# Patient Record
Sex: FEMALE | Race: WHITE | NOT HISPANIC OR LATINO | Employment: UNEMPLOYED | URBAN - METROPOLITAN AREA
[De-identification: names, ages, dates, MRNs, and addresses within clinical notes are randomized per-mention and may not be internally consistent; named-entity substitution may affect disease eponyms.]

---

## 2019-05-22 ENCOUNTER — HISTORICAL (OUTPATIENT)
Dept: RADIOLOGY | Facility: HOSPITAL | Age: 59
End: 2019-05-22

## 2020-02-22 ENCOUNTER — HOSPITAL ENCOUNTER (OUTPATIENT)
Dept: MEDSURG UNIT | Facility: HOSPITAL | Age: 60
End: 2020-02-23
Attending: INTERNAL MEDICINE | Admitting: INTERNAL MEDICINE

## 2020-02-22 LAB
ABS NEUT (OLG): 4.99 X10(3)/MCL (ref 2.1–9.2)
ALBUMIN SERPL-MCNC: 4.1 GM/DL (ref 3.4–5)
ALBUMIN/GLOB SERPL: 1 RATIO (ref 1.1–2)
ALP SERPL-CCNC: 107 UNIT/L (ref 38–126)
ALT SERPL-CCNC: 80 UNIT/L (ref 12–78)
AST SERPL-CCNC: 88 UNIT/L (ref 15–37)
BASOPHILS # BLD AUTO: 0 X10(3)/MCL (ref 0–0.2)
BASOPHILS NFR BLD AUTO: 0 %
BILIRUB SERPL-MCNC: 0.7 MG/DL (ref 0.2–1)
BILIRUBIN DIRECT+TOT PNL SERPL-MCNC: 0.2 MG/DL (ref 0–0.5)
BILIRUBIN DIRECT+TOT PNL SERPL-MCNC: 0.5 MG/DL (ref 0–0.8)
BUN SERPL-MCNC: 24 MG/DL (ref 7–18)
CALCIUM SERPL-MCNC: 8.8 MG/DL (ref 8.5–10.1)
CHLORIDE SERPL-SCNC: 102 MMOL/L (ref 98–107)
CO2 SERPL-SCNC: 25 MMOL/L (ref 21–32)
CREAT SERPL-MCNC: 1.03 MG/DL (ref 0.55–1.02)
ERYTHROCYTE [DISTWIDTH] IN BLOOD BY AUTOMATED COUNT: 12.8 % (ref 11.5–17)
FLUAV AG UPPER RESP QL IA.RAPID: POSITIVE
FLUBV AG UPPER RESP QL IA.RAPID: NEGATIVE
GLOBULIN SER-MCNC: 4.1 GM/DL (ref 2.4–3.5)
GLUCOSE SERPL-MCNC: 122 MG/DL (ref 74–106)
HCT VFR BLD AUTO: 42.2 % (ref 37–47)
HGB BLD-MCNC: 13.6 GM/DL (ref 12–16)
LYMPHOCYTES # BLD AUTO: 0.9 X10(3)/MCL (ref 0.6–4.6)
LYMPHOCYTES NFR BLD AUTO: 14 %
MCH RBC QN AUTO: 30.7 PG (ref 27–31)
MCHC RBC AUTO-ENTMCNC: 32.2 GM/DL (ref 33–36)
MCV RBC AUTO: 95.3 FL (ref 80–94)
MONOCYTES # BLD AUTO: 0.6 X10(3)/MCL (ref 0.1–1.3)
MONOCYTES NFR BLD AUTO: 9 %
NEUTROPHILS # BLD AUTO: 4.99 X10(3)/MCL (ref 2.1–9.2)
NEUTROPHILS NFR BLD AUTO: 76 %
PLATELET # BLD AUTO: 157 X10(3)/MCL (ref 130–400)
PMV BLD AUTO: 9.3 FL (ref 9.4–12.4)
POTASSIUM SERPL-SCNC: 4.2 MMOL/L (ref 3.5–5.1)
PROT SERPL-MCNC: 8.2 GM/DL (ref 6.4–8.2)
RBC # BLD AUTO: 4.43 X10(6)/MCL (ref 4.2–5.4)
SODIUM SERPL-SCNC: 134 MMOL/L (ref 136–145)
WBC # SPEC AUTO: 6.5 X10(3)/MCL (ref 4.5–11.5)

## 2020-02-23 LAB
ABS NEUT (OLG): 3.44 X10(3)/MCL (ref 2.1–9.2)
BASOPHILS # BLD AUTO: 0 X10(3)/MCL (ref 0–0.2)
BASOPHILS NFR BLD AUTO: 0 %
BUN SERPL-MCNC: 26 MG/DL (ref 7–18)
CALCIUM SERPL-MCNC: 8.2 MG/DL (ref 8.5–10.1)
CHLORIDE SERPL-SCNC: 106 MMOL/L (ref 98–107)
CO2 SERPL-SCNC: 28 MMOL/L (ref 21–32)
CREAT SERPL-MCNC: 0.96 MG/DL (ref 0.55–1.02)
CREAT/UREA NIT SERPL: 27.1
ERYTHROCYTE [DISTWIDTH] IN BLOOD BY AUTOMATED COUNT: 13.1 % (ref 11.5–17)
GLUCOSE SERPL-MCNC: 196 MG/DL (ref 74–106)
HCT VFR BLD AUTO: 39.3 % (ref 37–47)
HGB BLD-MCNC: 12.2 GM/DL (ref 12–16)
LYMPHOCYTES # BLD AUTO: 0.6 X10(3)/MCL (ref 0.6–4.6)
LYMPHOCYTES NFR BLD AUTO: 14 %
MCH RBC QN AUTO: 30.4 PG (ref 27–31)
MCHC RBC AUTO-ENTMCNC: 31 GM/DL (ref 33–36)
MCV RBC AUTO: 98 FL (ref 80–94)
MONOCYTES # BLD AUTO: 0.2 X10(3)/MCL (ref 0.1–1.3)
MONOCYTES NFR BLD AUTO: 4 %
NEUTROPHILS # BLD AUTO: 3.44 X10(3)/MCL (ref 2.1–9.2)
NEUTROPHILS NFR BLD AUTO: 81 %
PLATELET # BLD AUTO: 115 X10(3)/MCL (ref 130–400)
PMV BLD AUTO: 9.5 FL (ref 9.4–12.4)
POTASSIUM SERPL-SCNC: 4.1 MMOL/L (ref 3.5–5.1)
RBC # BLD AUTO: 4.01 X10(6)/MCL (ref 4.2–5.4)
SODIUM SERPL-SCNC: 139 MMOL/L (ref 136–145)
WBC # SPEC AUTO: 4.2 X10(3)/MCL (ref 4.5–11.5)

## 2021-09-02 ENCOUNTER — HISTORICAL (OUTPATIENT)
Dept: INFECTIOUS DISEASES | Facility: HOSPITAL | Age: 61
End: 2021-09-02

## 2022-01-31 ENCOUNTER — HISTORICAL (OUTPATIENT)
Dept: ADMINISTRATIVE | Facility: HOSPITAL | Age: 62
End: 2022-01-31

## 2022-04-30 NOTE — ED PROVIDER NOTES
Patient:   Zari Wilhelm             MRN: [id number]            FIN: [id number]               Age:   59 years     Sex:  Female     :  1960   Associated Diagnoses:   Influenza A; Elevated LFTs   Author:   Amrit Santana      Basic Information   Time seen: Date & time 2020 18:03:00.   History source: Patient.   Arrival mode: Private vehicle, walking.   History limitation: None.      History of Present Illness   The patient presents with cough and Patient c/o coughing, sore throat, headache, and body aches (cornell, NP). .  The onset was 1  days ago.  The course/duration of symptoms is fluctuating in intensity.  Character dry.  The degree at onset was moderate.  The degree at present is moderate.  The exacerbating factor is none.  The relieving factor is none.  Risk factors consist of asthma.  Prior episodes: occasional.  Therapy today: see nurses notes.  Associated symptoms: sore throat, rhinorrhea, nasal congestion and bodyaches, Nausea, Vomiting .        Review of Systems   ENMT symptoms:  Sore throat, nasal congestion.    Respiratory symptoms:  Cough.   Gastrointestinal symptoms:  Nausea, vomiting.    Musculoskeletal symptoms:  body aches.   Neurologic symptoms:  Headache.      Health Status   Allergies:    Allergic Reactions (Selected)  Severity Not Documented  Milk Products- Throat irritation.  Phenergan- Unknown.  Sulfa drugs- Unknown.,    Allergies (3) Active Reaction  Milk Products Throat irritation  Phenergan Unknown  sulfa drugs Unknown  .   Medications:  (Selected)   Prescriptions  Prescribed  Medrol Dosepak 4 mg oral tablet: = 1 tab(s), Oral, Daily, # 10 tab(s), 0 Refill(s)  Documented Medications  Documented  Breo Ellipta 100 mcg-25 mcg/inh inhalation powder:   Myoflex Creme 10% topical cream:   Ventolin HFA 90 mcg/inh inhalation aerosol:   albuterol CFC free 90 mcg/inh inhalation aerosol with adapter:   azithromycin 250 mg oral tablet: Oral, As Directed  levoFLOXacin 25  mg/mL oral solution:   oseltamivir 75 mg oral capsule: 75 mg = 1 cap(s), Oral, BID, per nurse's notes.   Immunizations: Per nurse's notes.   Menstrual history: Per nurse's notes.      Past Medical/ Family/ Social History   Medical history:    No active or resolved past medical history items have been selected or recorded., Reviewed as documented in chart.   Surgical history:    tracheostomy wth reversal, Sx on vocal cords.  RT CORNEA TRANSPLANT..  T&A., Reviewed as documented in chart.   Family history:    Entire family history is negative., Reviewed as documented in chart.   Social history: Reviewed as documented in chart.   Problem list:    Active Problems (1)  Asthma   , per nurse's notes.      Physical Examination               Vital Signs      No qualifying data available.   General:  Alert, no acute distress, well hydrated, Skin: Normal for ethnicity.    Skin:  Warm, dry, pink, intact.    Head:  Normocephalic.   Neck:  Supple, no tenderness, full range of motion.    Eye:  Pupils are equal, round and reactive to light, extraocular movements are intact, normal conjunctiva.    Ears, nose, mouth and throat:  No pharyngeal erythema or exudate, Nose: Bilateral nares, moderate, congestion.    Cardiovascular:  Regular rate and rhythm, No murmur, Normal peripheral perfusion.    Respiratory:  Lungs are clear to auscultation, breath sounds are equal, Respirations: not tachypneic, not labored, not shallow, Retractions: None.    Chest wall:  No tenderness.   Back:  Normal range of motion, Normal alignment, No costovertebral angle tenderness,    Musculoskeletal:  Normal ROM, normal strength, no swelling, no deformity.    Gastrointestinal:  Soft, Nontender, Non distended, Normal bowel sounds.    Neurological:  Alert and oriented to person, place, time, and situation, No focal neurological deficit observed, normal sensory observed, normal motor observed, normal speech observed, normal coordination observed, Gait: Normal.     Psychiatric:  Cooperative, appropriate mood & affect, normal judgment.       Medical Decision Making   Rationale:  Sats in room with good waveform was 87%. She does increase when walking. .   Documents reviewed:  Emergency department nurses' notes.   Orders  Launch Orders   Pharmacy:  Tamiflu 75 mg oral capsule (Order): 75 mg, form: Cap, Oral, Once, first dose 2/22/2020 20:51 CST, stop date 2/22/2020 20:51 CST, STAT, Launch Orders   Patient Care:  Saline Lock Insert (Order): 2/22/2020 20:53 CST  Pharmacy:  NS (0.9% Sodium Chloride) Infusion 1,000 mL (Order): 1,000 mL, 1,000 mL, IV, 1,000 mL/hr, start date 2/22/2020 20:53 CST, 1.66, m2  Zofran 2 mg/mL injectable solution (Order): 4 mg, form: Injection, IV, Once, first dose 2/22/2020 20:53 CST, stop date 2/22/2020 20:53 CST, STAT, Launch Orders   Pharmacy:  ibuprofen 600 mg oral tablet (Order): 600 mg, form: Tab, Oral, Once, first dose 2/22/2020 21:05 CST, stop date 2/22/2020 21:05 CST, STAT, Launch Orders   Pharmacy:  DuoNeb (Order): 3 mL, form: Soln, NEB, Once, first dose 2/22/2020 22:42 CST, stop date 2/22/2020 22:42 CST.    Results review:  Lab results : Lab View   2/22/2020 18:44 CST      Sodium Lvl                134 mmol/L  LOW                             Potassium Lvl             4.2 mmol/L                             Chloride                  102 mmol/L                             CO2                       25.0 mmol/L                             Calcium Lvl               8.8 mg/dL                             Glucose Lvl               122 mg/dL  HI                             BUN                       24.0 mg/dL  HI                             Creatinine                1.03 mg/dL  HI                             eGFR-AA                   >60 mL/min/1.73 m2  NA                             eGFR-CRISS                  59 mL/min/1.73 m2  NA                             Bili Total                0.7 mg/dL                             Bili Direct               0.20  mg/dL                             Bili Indirect             0.50 mg/dL                             AST                       88 unit/L  HI                             ALT                       80 unit/L  HI                             Alk Phos                  107 unit/L                             Total Protein             8.2 gm/dL                             Albumin Lvl               4.10 gm/dL                             Globulin                  4.10 gm/dL  HI                             A/G Ratio                 1.0 ratio  LOW                             WBC                       6.5 x10(3)/mcL                             RBC                       4.43 x10(6)/mcL                             Hgb                       13.6 gm/dL                             Hct                       42.2 %                             Platelet                  157 x10(3)/mcL                             MCV                       95.3 fL  HI                             MCH                       30.7 pg                             MCHC                      32.2 gm/dL  LOW                             RDW                       12.8 %                             MPV                       9.3 fL  LOW                             Abs Neut                  4.99 x10(3)/mcL                             Neutro Auto               76 %  NA                             Lymph Auto                14 %  NA                             Mono Auto                 9 %  NA                             Basophil Auto             0 %  NA                             Abs Neutro                4.99 x10(3)/mcL                             Abs Lymph                 0.9 x10(3)/mcL                             Abs Mono                  0.6 x10(3)/mcL                             Abs Baso                  0.0 x10(3)/mcL    2/22/2020 18:07 CST      Strep A PCR               NOT DETECTED    2/22/2020 18:05 CST      Influ A PCR               Positive                              Influ B PCR               Negative  ,    No qualifying data available.    Chest X-Ray:  Time reported 2/22/2020 23:39:00, no acute disease process, interpretation by Emergency Physician.    Radiology results:      Impression and Plan   Diagnosis   Influenza A (MFB30-VS J10.1)   Elevated LFTs (OFC33-BO R94.5)      Calls-Consults   -  2/22/2020 22:37:00 , Jordan KUHN, Darius LAMBERT, recommends Susan accepts admit.    Plan   Condition: Improved, Stable.    Disposition: Admit time  2/22/2020 23:10:00, Place in Observation Telemetry Unit.    Prescriptions   Patient was given the following educational materials   Follow up with   Counseled: Patient, Regarding diagnosis, Regarding diagnostic results, Regarding treatment plan, Regarding prescription, Patient indicated understanding of instructions.    Orders: Launch Orders   Admit/Transfer/Discharge:  Place in Outpatient Observation (Order): 2/22/2020 23:11 CST, Telemetry with Monitor Jordan KUHN, Darius LAMBERT, No.    Notes: Admitted in collaboration with Dr. Todd.

## 2022-05-04 NOTE — HISTORICAL [facility] CERNER
This is a historical note converted from Cerlefty. Formatting and pictures may have been removed.  Please reference Cerlefty for original formatting and attached multimedia. Chief Complaint  c/o cough, sore throat, HA, and body aches since last night.  History of Present Illness  Mr. Wilhelm is a 59 year old male with a medical history of asthma and tobacco use. She presented to [facility] ER with complaints of body aches, sore throat, headache, and nonproductive cough with occasional wheezing over the last 24 hours. Denies tobacco use or home 02 dependent. She is hoarse at baseline from a prior vocal cord surgery. Reports feeling a little better since being treated in the ER.  Initial ER VS: /85, , respirations 18, oral temperature 102.2, and SPO2 94% on RA.? CMP, CBC unremarkable.? Influenza A positive, strep negative.? CXR negative for acute concerns.? She was given 1 g by mouth Tylenol, ibuprofen, Solu-Medrol 60 IV, Zofran 4, and Tamiflu 75 by mouth while in the ER. Shes been admitted to the hospitalist services for further evaluation and management?of flu with hypoxia; ER reported her Sp02 to drop to 80-85% with ambulation on RA.  Review of Systems  Except as documented, all other systems reviewed and negative.  Physical Exam  Vitals & Measurements  T:?38? ?C (Oral)? TMIN:?37.2? ?C (Oral)? TMAX:?39? ?C (Oral)? HR:?81(Peripheral)? RR:?20? BP:?132/81? SpO2:?93%? WT:?63.5?kg?  General: Appears comfortable, no acute distress.  Cognition and speech:?Oriented, speech clear and coherent. Hoarse at baseline.  HEENT:?Normocephalic, normal hearing, oral mucosa is moist.  Neck:?No JVD, trachea at?midline, supple.  Respiratory:?Bilateral rhonchi, faint wheezing.?No accessory muscle use. ?Breath sounds are equal.  Cardiovascular:?Regular rhythm. Normal S1/S2. No gallops, murmurs or rub.? No pedal edema.  Gastrointestinal:?Normoactive bowel sound, soft and non-tender.  Integumentary:?Warm, dry,  intact.  Musculoskeletal:?Normal strength, no tenderness, no swelling.  Skin:?Warm and dry, no rashes or lesions.  Neuro:?AAOx3, no focal neurological deficit, normal sensory. Follows commands.  Psych:?Cooperative. Appropriate mood and affect.  ?   Assessment:  Acute Hypoxemic Respiratory Failure - Sp02 80-85% on RA with ambulation  Influenza A  Acute Bronchitis  Asthma Exacerbation  ?   Plan:?  - CXR negative for acute concerns; Strep (-)  - Doxycycline 100mg PO BID - Day #1  - Tamiflu 75mg PO BID - Day #1  - Solu-Medrol 60mg IV q8hr - Day #1  - Albuterol nebs q4hr & PRN  - Supplemental oxygen; wean to keep Sp02 > 92% on RA  - Resume HOME medications when med list obtained/med rec updated  - Repeat labs in am  ?  Source of history: Self. Medical record.?  Present at bedside: None.?  History limitation: None.  Provider information: PCP:?BC Perez NP  ?   Code status: Full code  DVT prophylaxis: Lovenox 40mg SC daily?  Admission time 72 minutes.?  ?  I, PARISH Bullard, reviewed and discussed the case with Dr. Darius Ortega.  ?  ?   I, Darius Ortega MD, assumed care of this patient at the time of this addendum and assisted with the composition of the above assessment and plan. For this patient encounter, I reviewed the NP or PA documentation, treatment plan, and medical decision making; and I had face-to-face time with this patient. ?Labs and imaging were reviewed and I agree with history, physical and medical decision making as detailed above.??  ?  59-year-old female presents with cough, shortness of breath and fever. ?Workup revealed positive influenza A swab.? No obvious consolidations on chest x-ray. ?She is also coughing up thick?greenish brown sputum so will be covered with an antibacterial as well for possible acute bronchitis on top of influenza. ?She has been started on Tamiflu.? She has bilateral rhonchi and faint bilateral wheezing on exam but no signs of respiratory distress. ?Wean  oxygen as tolerated, continue antibiotics,?steroids.   Problem List/Past Medical History  Asthma  Former tobacco use  Procedure/Surgical History  RT CORNEA TRANSPLANT.  T&A  tracheostomy wth reversal, Sx on vocal cords   Medications  Inpatient  acetaminophen, 650 mg= 20.3 mL, Oral, q6hr, PRN  acetaminophen, 1000 mg= 2 tab(s), Oral, q6hr, PRN  albuterol, 2.5 mg= 3 mL, NEB, q4hr, PRN  DuoNeb, 3 mL, NEB, Once  DuoNeb, 3 mL, NEB, QID Resp  Lovenox 40 mg/0.4 mL subcutaneous solution, 40 mg= 0.4 mL, Subcutaneous, Daily  Norco 5 mg-325 mg oral tablet, 1 tab(s), Oral, q6hr, PRN  NS (0.9% Sodium Chloride) Infusion 1,000 mL, 1000 mL, IV  SOLU-Medrol, 60 mg= 1.5 mL, IV Push, [id number]  Tamiflu 75 mg oral capsule, 75 mg= 1 cap(s), Oral, BID  Zofran, 4 mg= 2 mL, IV Push, q4hr, PRN  Home  albuterol CFC free 90 mcg/inh inhalation aerosol with adapter  azithromycin 250 mg oral tablet, Oral, As Directed,? ?Not taking  Breo Ellipta 100 mcg-25 mcg/inh inhalation powder  levoFLOXacin 25 mg/mL oral solution  Medrol Dosepak 4 mg oral tablet, 1 tab(s), Oral, Daily  Myoflex Creme 10% topical cream  oseltamivir 75 mg oral capsule, 75 mg= 1 cap(s), Oral, BID,? ?Not taking  Ventolin HFA 90 mcg/inh inhalation aerosol  Allergies  Milk Products?(Throat irritation)  Phenergan?(Unknown)  sulfa drugs?(Unknown)  Social History  Denies EtOH or illicit drug use.  Former tobacco use; quit at 17yoa.  Family History  Family history is negative  Lab Results  Labs Last 24 Hours?  ?Chemistry? Hematology/Coagulation?   Sodium Lvl:?134 mmol/L?Low (02/22/20 18:44:00) WBC: 6.5 x10(3)/mcL (02/22/20 18:44:00)   Potassium Lvl: 4.2 mmol/L (02/22/20 18:44:00) RBC: 4.43 x10(6)/mcL (02/22/20 18:44:00)   Chloride: 102 mmol/L (02/22/20 18:44:00) Hgb: 13.6 gm/dL (02/22/20 18:44:00)   CO2: 25 mmol/L (02/22/20 18:44:00) Hct: 42.2 % (02/22/20 18:44:00)   Calcium Lvl: 8.8 mg/dL (02/22/20 18:44:00) Platelet: 157 x10(3)/mcL (02/22/20 18:44:00)   Glucose Lvl:?122 mg/dL?High  (02/22/20 18:44:00) MCV:?95.3 fL?High (02/22/20 18:44:00)   BUN:?24 mg/dL?High (02/22/20 18:44:00) MCH: 30.7 pg (02/22/20 18:44:00)   Creatinine:?1.03 mg/dL?High (02/22/20 18:44:00) MCHC:?32.2 gm/dL?Low (02/22/20 18:44:00)   eGFR-AA: >60 (02/22/20 18:44:00) RDW: 12.8 % (02/22/20 18:44:00)   eGFR-CRISS: 59 mL/min/1.73 m2 (02/22/20 18:44:00) MPV:?9.3 fL?Low (02/22/20 18:44:00)   Bili Total: 0.7 mg/dL (02/22/20 18:44:00) Abs Neut: 4.99 x10(3)/mcL (02/22/20 18:44:00)   Bili Direct: 0.2 mg/dL (02/22/20 18:44:00) Neutro Auto: 76 % (02/22/20 18:44:00)   Bili Indirect: 0.5 mg/dL (02/22/20 18:44:00) Lymph Auto: 14 % (02/22/20 18:44:00)   AST:?88 unit/L?High (02/22/20 18:44:00) Mono Auto: 9 % (02/22/20 18:44:00)   ALT:?80 unit/L?High (02/22/20 18:44:00) Basophil Auto: 0 % (02/22/20 18:44:00)   Alk Phos: 107 unit/L (02/22/20 18:44:00) Abs Neutro: 4.99 x10(3)/mcL (02/22/20 18:44:00)   Total Protein: 8.2 gm/dL (02/22/20 18:44:00) Abs Lymph: 0.9 x10(3)/mcL (02/22/20 18:44:00)   Albumin Lvl: 4.1 gm/dL (02/22/20 18:44:00) Abs Mono: 0.6 x10(3)/mcL (02/22/20 18:44:00)   Globulin:?4.1 gm/dL?High (02/22/20 18:44:00) Abs Baso: 0 x10(3)/mcL (02/22/20 18:44:00)   A/G Ratio:?1 ratio?Low (02/22/20 18:44:00)    Diagnostic Results  CXR negative for acute concerns;

## 2022-05-04 NOTE — HISTORICAL [facility] CERNER
This is a historical note converted from Darell. Formatting and pictures may have been removed.  Please reference Darell for original formatting and attached multimedia. Admit and Discharge Dates  Admit Date: 02/22/2020  Discharge Date: 02/23/2020  Physicians  Attending Physician - Jordan KUHN, Darius LAMBERT  Admitting Physician - Darius Ortega MD  Primary Care Physician - Cami Hernandez  Discharge Diagnosis  Cough?[id number]  Elevated LFTs?R94.5  Influenza A?J10.1  Asthma exacerbation  Hospital Course  ?  59 year old male with a medical history of asthma and tobacco use admitted on?2/22 for?as exacerbation and?influenza A infection.?Influenza A positive, strep negative.? CXR negative for acute concerns.? She was given?Tamiflu?and IV steroids,?admitted to the hospitalist services. ?Patient improvement overnight. ?She has been weaned off of oxygen. ?She is afebrile today. ?On exam lungs are clear. ?Patient stable for discharge home with?continued treatment of her influenza and asthma exacerbation outpatient.? She was given Tamiflu and?prednisone taper along with short course of doxycycline. ?She will need follow with her PCP as noted.  Objective  Vitals & Measurements  T:?36.6? ?C (Oral)? TMIN:?36.3? ?C (Oral)? TMAX:?39? ?C (Oral)? HR:?86(Peripheral)? RR:?18? BP:?110/64? SpO2:?96%? WT:?63.5?kg? BMI:?24.45?  Physical Exam  General:?female, in no acute distress  Respiratory:?clear to auscultation bilaterally  Cardiovascular:?regular rate and rhythm without murmurs, gallops or rubs, no edema  Gastrointestinal:?soft, non-tender, non-distended with normal bowel sounds  Neurologic: cranial nerves intact, no focal deficits  Patient Discharge Condition  stable  Discharge Disposition  Home   Discharge Medication Reconciliation  Prescribed  albuterol (albuterol CFC free 90 mcg/inh inhalation aerosol with adapter)?2 puff(s), INH, q6hr, PRN as needed for wheezing  albuterol (albuterol  CFC free 90 mcg/inh inhalation aerosol with adapter)?2 puff(s), INH, q6hr, PRN as needed for wheezing  doxycycline (doxycycline hyclate 50 mg oral capsule)?100 mg, Oral, BID  oseltamivir (Tamiflu 75 mg oral capsule)?75 mg, Oral, BID  predniSONE (prednisONE 10 mg oral tablet)?See Instructions  Continue  fluticasone-vilanterol (Breo Ellipta 100 mcg-25 mcg/inh inhalation powder)  Education and Orders Provided  Influenza, Adult  Discharge - 02/23/20 11:33:00 CST, Home, Give all scheduled vaccinations prior to discharge.?  Discharge Activity - Activity as Tolerated?  Discharge Diet - Regular?  Follow up  Chris MARTIN, Cami [facility], within 1 to 2 weeks

## 2023-03-06 ENCOUNTER — HOSPITAL ENCOUNTER (EMERGENCY)
Facility: HOSPITAL | Age: 63
Discharge: HOME | End: 2023-03-06
Attending: EMERGENCY MEDICINE
Payer: MEDICARE

## 2023-03-06 VITALS
HEART RATE: 68 BPM | OXYGEN SATURATION: 96 % | SYSTOLIC BLOOD PRESSURE: 118 MMHG | DIASTOLIC BLOOD PRESSURE: 78 MMHG | TEMPERATURE: 98 F | WEIGHT: 135 LBS | HEIGHT: 63 IN | BODY MASS INDEX: 23.92 KG/M2 | RESPIRATION RATE: 20 BRPM

## 2023-03-06 DIAGNOSIS — M65.4 DE QUERVAIN'S TENOSYNOVITIS, LEFT: Primary | ICD-10-CM

## 2023-03-06 DIAGNOSIS — R52 PAIN: ICD-10-CM

## 2023-03-06 PROCEDURE — 99283 EMERGENCY DEPT VISIT LOW MDM: CPT

## 2023-03-06 RX ORDER — IBUPROFEN 800 MG/1
800 TABLET ORAL 3 TIMES DAILY
Qty: 30 TABLET | Refills: 0 | Status: SHIPPED | OUTPATIENT
Start: 2023-03-06 | End: 2023-03-16

## 2023-03-06 NOTE — ED PROVIDER NOTES
Encounter Date: 3/6/2023       History     Chief Complaint   Patient presents with    Wrist Pain     Pt c/o pain to left wrist onset four months ago, denies injury.     The history is provided by the patient. No  was used.   Wrist Pain  This is a chronic problem. The current episode started more than 1 week ago. The problem occurs constantly. The problem has been gradually worsening. Pertinent negatives include no chest pain and no shortness of breath. The symptoms are aggravated by bending. Nothing relieves the symptoms.   Denies specific injury; notes that she uses her hands a lot.    Review of patient's allergies indicates:   Allergen Reactions    Sulfa (sulfonamide antibiotics) Shortness Of Breath    Promethazine Itching     Other reaction(s): Not Indicated, Unknown  hallucinations       No past medical history on file.  No past surgical history on file.  No family history on file.     Review of Systems   Constitutional:  Negative for fever.   HENT:  Negative for sore throat.    Respiratory:  Negative for shortness of breath.    Cardiovascular:  Negative for chest pain.   Gastrointestinal:  Negative for nausea.   Genitourinary:  Negative for dysuria.   Musculoskeletal:  Negative for back pain.   Skin:  Negative for rash.   Neurological:  Negative for weakness.   Hematological:  Does not bruise/bleed easily.     Physical Exam     Initial Vitals [03/06/23 1434]   BP Pulse Resp Temp SpO2   118/78 68 20 97.7 °F (36.5 °C) 96 %      MAP       --         Physical Exam    Nursing note and vitals reviewed.  Constitutional: She appears well-developed and well-nourished.   HENT:   Head: Normocephalic and atraumatic.   Right Ear: External ear normal.   Left Ear: External ear normal.   Eyes: Conjunctivae and EOM are normal. Pupils are equal, round, and reactive to light.   Neck: Neck supple.   Normal range of motion.  Cardiovascular:  Normal rate, regular rhythm, normal heart sounds and intact distal  pulses.           Pulmonary/Chest: Breath sounds normal.   Abdominal: Abdomen is soft. Bowel sounds are normal.   Musculoskeletal:         General: Normal range of motion.        Hands:       Cervical back: Normal range of motion and neck supple.      Comments: Small ganglion cyst noted on left EPL tendon; + Finkelstein's     Neurological: She is alert and oriented to person, place, and time. GCS score is 15. GCS eye subscore is 4. GCS verbal subscore is 5. GCS motor subscore is 6.   Skin: Skin is warm and dry. Capillary refill takes less than 2 seconds.   Psychiatric: She has a normal mood and affect. Her behavior is normal. Judgment and thought content normal.       ED Course   Procedures  Labs Reviewed - No data to display       Imaging Results              X-Ray Wrist Complete Left (Preliminary result)  Result time 03/06/23 15:19:54      Wet Read by Darius Steel MD (03/06/23 15:19:54, [facility] - Emergency Dept, Emergency Medicine)    NAF                                     Medications - No data to display                  Exam c/w DeQuervain's tenosynovitis, complicated by ganglion cyst.  Will obtain xray to r/o bony abnormalities and if negative, place thumb spica and D/C on NSAID's and refer to Dr. Gautam (ortho/hand surgery).     Xray negative; Ortho-Glass thumb spica cast applied by be.  Neurovascular structures intact post splinting.  The splint was deemed to be effective and pt feels comfortable in the splint.    Clinical Impression:   Final diagnoses:  [R52] Pain  [M65.4] De Quervain's tenosynovitis, left (Primary)        ED Disposition Condition    Discharge Stable          ED Prescriptions       Medication Sig Dispense Start Date End Date Auth. Provider    ibuprofen (ADVIL,MOTRIN) 800 MG tablet Take 1 tablet (800 mg total) by mouth 3 (three) times daily. for 10 days 30 tablet 3/6/2023 3/16/2023 Daruis Steel MD          Follow-up Information       Follow up With  Specialties Details Why Contact Info    Julian Gautam MD Hand Surgery, Orthopedic Surgery Schedule an appointment as soon as possible for a visit in 2 weeks  [address] [facility] [address] [place] [id number]  [phone]               Darius Steel MD  03/06/23 [id number]

## 2023-03-07 ENCOUNTER — HOSPITAL ENCOUNTER (EMERGENCY)
Facility: HOSPITAL | Age: 63
Discharge: HOME | End: 2023-03-07
Attending: EMERGENCY MEDICINE
Payer: MEDICARE

## 2023-03-07 VITALS
HEART RATE: 70 BPM | TEMPERATURE: 99 F | WEIGHT: 135 LBS | RESPIRATION RATE: 18 BRPM | SYSTOLIC BLOOD PRESSURE: 137 MMHG | OXYGEN SATURATION: 100 % | DIASTOLIC BLOOD PRESSURE: 100 MMHG | HEIGHT: 63 IN | BODY MASS INDEX: 23.92 KG/M2

## 2023-03-07 DIAGNOSIS — M25.532 LEFT WRIST PAIN: Primary | ICD-10-CM

## 2023-03-07 PROCEDURE — 99283 EMERGENCY DEPT VISIT LOW MDM: CPT

## 2023-03-07 NOTE — ED TRIAGE NOTES
Pt concerned at swelling to left hand thinking that applied splint is too tight at evaluation of cyst to left wrist area. Pt has appt with Orthopedist on 3/22/23

## 2023-03-07 NOTE — DISCHARGE INSTRUCTIONS
Wear splint for support. May get velcro thumb spica splint that you are able to remove for easier managing. Keep appointment with dr. Gautam on 3/22.

## 2023-03-07 NOTE — ED PROVIDER NOTES
Encounter Date: 3/7/2023       History     Chief Complaint   Patient presents with    Recheck     Pt concerned at swelling to left hand thinking that applied splint is too tight at evaluation of cyst to left wrist area     61 y/o female presents with concern her splint from yesterday's ER visit is too tight. She felt some irritation and had some swelling. She also did cut grass yesterday. She was dx with de Quervain's tenosynovitis and she states she now has f/u with Dr. Gautam on 3/22.     The history is provided by the patient. No  was used.   Review of patient's allergies indicates:   Allergen Reactions    Sulfa (sulfonamide antibiotics) Shortness Of Breath    Promethazine Itching     Other reaction(s): Not Indicated, Unknown  hallucinations       No past medical history on file.  No past surgical history on file.  No family history on file.     Review of Systems   Musculoskeletal:         Feels splint is too tight   All other systems reviewed and are negative.    Physical Exam     Initial Vitals [03/07/23 1404]   BP Pulse Resp Temp SpO2   (!) 137/100 70 18 98.6 °F (37 °C) 100 %      MAP       --         Physical Exam    Nursing note and vitals reviewed.  Constitutional: She appears well-developed and well-nourished.   Cardiovascular:  Regular rhythm and intact distal pulses.           Pulmonary/Chest: No respiratory distress.   Musculoskeletal:      Right wrist: Tenderness present. No swelling. Normal range of motion.      Comments: Left wrist tender. She did have a slight red tint to the telles side of her left thumb at the base. No break in skin.     All other adjacent joints otherwise normal       Neurological: She is alert and oriented to person, place, and time.   Skin: Skin is warm and dry.   Psychiatric: She has a normal mood and affect.       ED Course   Procedures  Labs Reviewed - No data to display       Imaging Results    None          Medications - No data to display  Medical  Decision Making:   History:   Old Medical Records: I decided to obtain old medical records.  Old Records Summarized: records from previous admission(s).       <> Summary of Records: Seen yesterday, had xray, dx with de quervain's tenosynovitis. Thumb spica splint applied.   Differential Diagnosis:   Includes but not limited to splint reapplication, de quervain's tenosynovitis.  ED Management:  Splint removed. Hand and wrist examined. Used same splint but put extra padding. Distal neurovascular intact. Discussed she can remove if needed or may get a velcro thumb spica splint and wear. Keep her f/u with dr. Gautam on 3/22.                         Clinical Impression:   Final diagnoses:  [M25.532] Left wrist pain (Primary)        ED Disposition Condition    Discharge Stable          ED Prescriptions    None       Follow-up Information       Follow up With Specialties Details Why Contact Info    Man Roberts MD Family Medicine   [address] [facility] [address]  [phone]      Julian Gautam MD Hand Surgery, Orthopedic Surgery  keep scheduled appointment on 3/22 [address] [facility] [address]  [facility] [address]  [phone]               Melisa Kowalski, [facility]  03/07/23 4043

## 2023-03-22 ENCOUNTER — OFFICE VISIT (OUTPATIENT)
Dept: ORTHOPEDICS | Facility: CLINIC | Age: 63
End: 2023-03-22
Payer: MEDICARE

## 2023-03-22 VITALS
BODY MASS INDEX: 23.92 KG/M2 | SYSTOLIC BLOOD PRESSURE: 123 MMHG | WEIGHT: 135 LBS | DIASTOLIC BLOOD PRESSURE: 77 MMHG | HEIGHT: 63 IN | HEART RATE: 74 BPM

## 2023-03-22 DIAGNOSIS — M65.4 DE QUERVAIN'S TENOSYNOVITIS, LEFT: Primary | ICD-10-CM

## 2023-03-22 PROCEDURE — 20550 TENDON SHEATH: ICD-10-PCS | Mod: LT,,, | Performed by: STUDENT IN AN ORGANIZED HEALTH CARE EDUCATION/TRAINING PROGRAM

## 2023-03-22 PROCEDURE — 20550 NJX 1 TENDON SHEATH/LIGAMENT: CPT | Mod: LT,,, | Performed by: STUDENT IN AN ORGANIZED HEALTH CARE EDUCATION/TRAINING PROGRAM

## 2023-03-22 PROCEDURE — 99203 OFFICE O/P NEW LOW 30 MIN: CPT | Mod: 25,,, | Performed by: STUDENT IN AN ORGANIZED HEALTH CARE EDUCATION/TRAINING PROGRAM

## 2023-03-22 PROCEDURE — 99203 PR OFFICE/OUTPT VISIT, NEW, LEVL III, 30-44 MIN: ICD-10-PCS | Mod: 25,,, | Performed by: STUDENT IN AN ORGANIZED HEALTH CARE EDUCATION/TRAINING PROGRAM

## 2023-03-22 RX ORDER — ALBUTEROL SULFATE 90 UG/1
AEROSOL, METERED RESPIRATORY (INHALATION)
COMMUNITY
Start: 2023-01-26

## 2023-03-22 RX ADMIN — BETAMETHASONE SODIUM PHOSPHATE AND BETAMETHASONE ACETATE 6 MG: 3; 3 INJECTION, SUSPENSION INTRA-ARTICULAR; INTRALESIONAL; INTRAMUSCULAR; SOFT TISSUE at 03:03

## 2023-03-22 RX ADMIN — LIDOCAINE HYDROCHLORIDE 1 ML: 10 INJECTION INFILTRATION; PERINEURAL at 03:03

## 2023-03-27 RX ORDER — BETAMETHASONE SODIUM PHOSPHATE AND BETAMETHASONE ACETATE 3; 3 MG/ML; MG/ML
6 INJECTION, SUSPENSION INTRA-ARTICULAR; INTRALESIONAL; INTRAMUSCULAR; SOFT TISSUE
Status: DISCONTINUED | OUTPATIENT
Start: 2023-03-22 | End: 2023-03-27 | Stop reason: HOSPADM

## 2023-03-27 RX ORDER — LIDOCAINE HYDROCHLORIDE 10 MG/ML
1 INJECTION INFILTRATION; PERINEURAL
Status: DISCONTINUED | OUTPATIENT
Start: 2023-03-22 | End: 2023-03-27 | Stop reason: HOSPADM

## 2023-03-27 NOTE — PROCEDURES
Tendon Sheath    Date/Time: 3/22/2023 3:00 PM  Performed by: Julian Gautam MD  Authorized by: Julian Gautam MD     Consent Done?:  Yes (Verbal)  Indications:  Pain  Timeout: prior to procedure the correct patient, procedure, and site was verified    Location:  Wrist  Site:  L first doral compartment  Needle size:  25 G  Approach:  Radial  Medications:  1 mL LIDOcaine HCL 10 mg/ml (1%) 10 mg/mL (1 %); 6 mg betamethasone acetate-betamethasone sodium phosphate 6 mg/mL  Patient tolerance:  Patient tolerated the procedure well with no immediate complications

## 2023-03-27 NOTE — PROGRESS NOTES
Chief Complaint: Left wrist pain with cyst    Consulting Physician: No ref. provider found    History of present illness:     Patient is a 62-year-old female who presents for initial evaluation of the left wrist cyst with pain.  She localizes this over the radial aspect of the wrist.  Occasionally radiates proximally and distally by point of maximal pain is at the area of this small cyst.  She localizes over 1st dorsal compartment.  She noticed it about a year ago but then her pain worsened over the last month and half.  She is not had an injection.  She is tried a brace with some relief although brace does not immobilize the thumb.  She denies any numbness or tingling radiating distally into the hand.  No trauma.  No other complaints.    Past Medical History:   Diagnosis Date    Chronic bronchitis        Past Surgical History:   Procedure Laterality Date    HYSTERECTOMY      TRACHEOTOMY         Current Outpatient Medications   Medication Sig    albuterol (PROVENTIL/VENTOLIN HFA) 90 mcg/actuation inhaler Inhale into the lungs.     No current facility-administered medications for this visit.       Review of patient's allergies indicates:   Allergen Reactions    Sulfa (sulfonamide antibiotics) Shortness Of Breath    Promethazine Itching     Other reaction(s): Not Indicated, Unknown  hallucinations         Family History   Problem Relation Age of Onset    No Known Problems Mother     No Known Problems Father        Social History     Socioeconomic History    Marital status:    Tobacco Use    Smoking status: Never     Passive exposure: Never    Smokeless tobacco: Never   Substance and Sexual Activity    Alcohol use: Yes     Comment: soc    Drug use: Never       Review of Systems:    Constitution:   Denies chills, fever, and sweats.  HENT:   Denies headaches or blurry vision.  Cardiovascular:  Denies chest pain or irregular heart beat.  Respiratory:   Denies cough or shortness of breath.  Gastrointestinal:  Denies  "abdominal pain, nausea, or vomiting.  Musculoskeletal:   Denies muscle cramps.  Neurological:   Denies dizziness or focal weakness.  Psychiatric/Behavior: Normal mental status.  Hematology/Lymph:  Denies bleeding problem or easy bruising/bleeding.  Skin:    Denies rash or suspicious lesions.    Examination:    Vital Signs:    Vitals:    03/22/23 1520   BP: 123/77   Pulse: 74   Weight: 61.2 kg (135 lb)   Height: 5' 3" (1.6 m)   PainSc:   8       Body mass index is 23.91 kg/m².    Constitution:   Well-developed, well nourished patient in no acute distress.  Neurological:   Alert and oriented x 3 and cooperative to examination.     Psychiatric/Behavior: Normal mental status.  Respiratory:   No shortness of breath.  Eyes:    Extraoccular muscles intact  Skin:    No scars, rash or suspicious lesions.    MSK:     Left wrist:  No open wounds or rashes.  Full range of motion of the wrist hand and digits.  Tenderness to palpation of the 1st dorsal compartment.  Finkelstein's positive.  There is a 2-3 mm round well-circumscribed mobile cyst just under the skin over the 1st dorsal compartment.  She is able to make a fist and extend his digits.  She is neurovascularly intact.  Radial pulse 2 +hand is warm well perfused.  There is no tenderness to palpation over the thumb CMC joint.  No tenderness to palpation the intersection 1st dorsal compartments.  No tenderness to palpation over the thumb A1 pulley.    Imaging:     X-ray of the left wrist shows no fractures or dislocations     Assessment:  left de Quervain tenosynovitis, retinacular cyst    Plan:   I will start by treating this conservatively.  I will give her an injection to the 1st dorsal compartment today.  I will also give her a thumb spica splint that she can wear.  If the injection fails to get rid of her pain we can send her to formal therapy.    Follow Up:   As needed if the injection fails  Xray at next visit:    none      "

## 2023-05-24 ENCOUNTER — OFFICE VISIT (OUTPATIENT)
Dept: ORTHOPEDICS | Facility: CLINIC | Age: 63
End: 2023-05-24
Payer: COMMERCIAL

## 2023-05-24 DIAGNOSIS — M65.4 DE QUERVAIN'S TENOSYNOVITIS, LEFT: Primary | ICD-10-CM

## 2023-05-24 PROCEDURE — 99213 OFFICE O/P EST LOW 20 MIN: CPT | Mod: ,,, | Performed by: STUDENT IN AN ORGANIZED HEALTH CARE EDUCATION/TRAINING PROGRAM

## 2023-05-24 PROCEDURE — 99213 PR OFFICE/OUTPT VISIT, EST, LEVL III, 20-29 MIN: ICD-10-PCS | Mod: ,,, | Performed by: STUDENT IN AN ORGANIZED HEALTH CARE EDUCATION/TRAINING PROGRAM

## 2023-05-30 NOTE — PROGRESS NOTES
Chief Complaint: Left wrist pain with cyst    Consulting Physician: No ref. provider found    History of present illness:     Patient is a 62-year-old female who presents for follow up evaluation of left dequervains tenosynovitis.  I gave her an injection last time that helped for a few weeks but the pain returned. She has not done formal OT yet.     Past Medical History:   Diagnosis Date    Chronic bronchitis        Past Surgical History:   Procedure Laterality Date    HYSTERECTOMY      TRACHEOTOMY         Current Outpatient Medications   Medication Sig    albuterol (PROVENTIL/VENTOLIN HFA) 90 mcg/actuation inhaler Inhale into the lungs.     No current facility-administered medications for this visit.       Review of patient's allergies indicates:   Allergen Reactions    Sulfa (sulfonamide antibiotics) Shortness Of Breath    Promethazine Itching     Other reaction(s): Not Indicated, Unknown  hallucinations         Family History   Problem Relation Age of Onset    No Known Problems Mother     No Known Problems Father        Social History     Socioeconomic History    Marital status:    Tobacco Use    Smoking status: Never     Passive exposure: Never    Smokeless tobacco: Never   Substance and Sexual Activity    Alcohol use: Yes     Comment: soc    Drug use: Never       Review of Systems:    Constitution:   Denies chills, fever, and sweats.  HENT:   Denies headaches or blurry vision.  Cardiovascular:  Denies chest pain or irregular heart beat.  Respiratory:   Denies cough or shortness of breath.  Gastrointestinal:  Denies abdominal pain, nausea, or vomiting.  Musculoskeletal:   Denies muscle cramps.  Neurological:   Denies dizziness or focal weakness.  Psychiatric/Behavior: Normal mental status.  Hematology/Lymph:  Denies bleeding problem or easy bruising/bleeding.  Skin:    Denies rash or suspicious lesions.    Examination:    Vital Signs:    Vitals:    05/24/23 1235   PainSc:   1       There is no height or  weight on file to calculate BMI.    Constitution:   Well-developed, well nourished patient in no acute distress.  Neurological:   Alert and oriented x 3 and cooperative to examination.     Psychiatric/Behavior: Normal mental status.  Respiratory:   No shortness of breath.  Eyes:    Extraoccular muscles intact  Skin:    No scars, rash or suspicious lesions.    MSK:     Left wrist:  No open wounds or rashes.  Full range of motion of the wrist hand and digits.  Tenderness to palpation of the 1st dorsal compartment.  Finkelstein's positive.  There is a 2-3 mm round well-circumscribed mobile cyst just under the skin over the 1st dorsal compartment.  She is able to make a fist and extend his digits.  She is neurovascularly intact.  Radial pulse 2 +hand is warm well perfused.  There is no tenderness to palpation over the thumb CMC joint.  No tenderness to palpation the intersection 1st dorsal compartments.  No tenderness to palpation over the thumb A1 pulley.    Imaging:     X-ray of the left wrist shows no fractures or dislocations     Assessment:  left de Quervain tenosynovitis, retinacular cyst    Plan:   I will send her to formal therapy. They can work on phono and iontophoresis as well as stretching. I will see her back in 6 weeks to see if therapy is helping. If OT does not help we can discuss surgery.     Follow Up:   6 weeks   Xray at next visit:    none

## 2023-07-26 ENCOUNTER — OFFICE VISIT (OUTPATIENT)
Dept: ORTHOPEDICS | Facility: CLINIC | Age: 63
End: 2023-07-26
Payer: COMMERCIAL

## 2023-07-26 VITALS
DIASTOLIC BLOOD PRESSURE: 73 MMHG | SYSTOLIC BLOOD PRESSURE: 116 MMHG | WEIGHT: 135 LBS | HEIGHT: 63 IN | HEART RATE: 77 BPM | BODY MASS INDEX: 23.92 KG/M2

## 2023-07-26 DIAGNOSIS — M65.4 DE QUERVAIN'S TENOSYNOVITIS, LEFT: Primary | ICD-10-CM

## 2023-07-26 PROCEDURE — 1159F MED LIST DOCD IN RCRD: CPT | Mod: CPTII,,, | Performed by: STUDENT IN AN ORGANIZED HEALTH CARE EDUCATION/TRAINING PROGRAM

## 2023-07-26 PROCEDURE — 3078F PR MOST RECENT DIASTOLIC BLOOD PRESSURE < 80 MM HG: ICD-10-PCS | Mod: CPTII,,, | Performed by: STUDENT IN AN ORGANIZED HEALTH CARE EDUCATION/TRAINING PROGRAM

## 2023-07-26 PROCEDURE — 20550 NJX 1 TENDON SHEATH/LIGAMENT: CPT | Mod: LT,,, | Performed by: STUDENT IN AN ORGANIZED HEALTH CARE EDUCATION/TRAINING PROGRAM

## 2023-07-26 PROCEDURE — 1159F PR MEDICATION LIST DOCUMENTED IN MEDICAL RECORD: ICD-10-PCS | Mod: CPTII,,, | Performed by: STUDENT IN AN ORGANIZED HEALTH CARE EDUCATION/TRAINING PROGRAM

## 2023-07-26 PROCEDURE — 99213 PR OFFICE/OUTPT VISIT, EST, LEVL III, 20-29 MIN: ICD-10-PCS | Mod: 25,,, | Performed by: STUDENT IN AN ORGANIZED HEALTH CARE EDUCATION/TRAINING PROGRAM

## 2023-07-26 PROCEDURE — 20550 TENDON SHEATH: ICD-10-PCS | Mod: LT,,, | Performed by: STUDENT IN AN ORGANIZED HEALTH CARE EDUCATION/TRAINING PROGRAM

## 2023-07-26 PROCEDURE — 3008F BODY MASS INDEX DOCD: CPT | Mod: CPTII,,, | Performed by: STUDENT IN AN ORGANIZED HEALTH CARE EDUCATION/TRAINING PROGRAM

## 2023-07-26 PROCEDURE — 99213 OFFICE O/P EST LOW 20 MIN: CPT | Mod: 25,,, | Performed by: STUDENT IN AN ORGANIZED HEALTH CARE EDUCATION/TRAINING PROGRAM

## 2023-07-26 PROCEDURE — 3074F PR MOST RECENT SYSTOLIC BLOOD PRESSURE < 130 MM HG: ICD-10-PCS | Mod: CPTII,,, | Performed by: STUDENT IN AN ORGANIZED HEALTH CARE EDUCATION/TRAINING PROGRAM

## 2023-07-26 PROCEDURE — 3074F SYST BP LT 130 MM HG: CPT | Mod: CPTII,,, | Performed by: STUDENT IN AN ORGANIZED HEALTH CARE EDUCATION/TRAINING PROGRAM

## 2023-07-26 PROCEDURE — 3008F PR BODY MASS INDEX (BMI) DOCUMENTED: ICD-10-PCS | Mod: CPTII,,, | Performed by: STUDENT IN AN ORGANIZED HEALTH CARE EDUCATION/TRAINING PROGRAM

## 2023-07-26 PROCEDURE — 3078F DIAST BP <80 MM HG: CPT | Mod: CPTII,,, | Performed by: STUDENT IN AN ORGANIZED HEALTH CARE EDUCATION/TRAINING PROGRAM

## 2023-07-26 RX ADMIN — LIDOCAINE HYDROCHLORIDE 1 ML: 10 INJECTION INFILTRATION; PERINEURAL at 01:07

## 2023-07-26 RX ADMIN — BETAMETHASONE SODIUM PHOSPHATE AND BETAMETHASONE ACETATE 6 MG: 3; 3 INJECTION, SUSPENSION INTRA-ARTICULAR; INTRALESIONAL; INTRAMUSCULAR; SOFT TISSUE at 01:07

## 2023-07-27 RX ORDER — BETAMETHASONE SODIUM PHOSPHATE AND BETAMETHASONE ACETATE 3; 3 MG/ML; MG/ML
6 INJECTION, SUSPENSION INTRA-ARTICULAR; INTRALESIONAL; INTRAMUSCULAR; SOFT TISSUE
Status: DISCONTINUED | OUTPATIENT
Start: 2023-07-26 | End: 2023-07-27 | Stop reason: HOSPADM

## 2023-07-27 RX ORDER — LIDOCAINE HYDROCHLORIDE 10 MG/ML
1 INJECTION INFILTRATION; PERINEURAL
Status: DISCONTINUED | OUTPATIENT
Start: 2023-07-26 | End: 2023-07-27 | Stop reason: HOSPADM

## 2023-07-27 NOTE — PROCEDURES
Tendon Sheath    Date/Time: 7/26/2023 1:30 PM  Performed by: Julian Gautam MD  Authorized by: Julian Gautam MD     Consent Done?:  Yes (Verbal)  Indications:  Pain  Timeout: prior to procedure the correct patient, procedure, and site was verified    Location:  Wrist  Site:  L first doral compartment  Needle size:  25 G  Approach:  Radial  Medications:  1 mL LIDOcaine HCL 10 mg/ml (1%) 10 mg/mL (1 %); 6 mg betamethasone acetate-betamethasone sodium phosphate 6 mg/mL  Patient tolerance:  Patient tolerated the procedure well with no immediate complications

## 2023-07-27 NOTE — PROGRESS NOTES
Chief Complaint: Left wrist pain with cyst    Consulting Physician: No ref. provider found    History of present illness:     Patient is a 62-year-old female who presents for follow up evaluation of left dequervains tenosynovitis.  Last time I saw her I sent her to formal hand therapy which has been helping some.  She would like another injection today    Past Medical History:   Diagnosis Date    Chronic bronchitis        Past Surgical History:   Procedure Laterality Date    HYSTERECTOMY      TRACHEOTOMY         Current Outpatient Medications   Medication Sig    albuterol (PROVENTIL/VENTOLIN HFA) 90 mcg/actuation inhaler Inhale into the lungs.     No current facility-administered medications for this visit.       Review of patient's allergies indicates:   Allergen Reactions    Sulfa (sulfonamide antibiotics) Shortness Of Breath    Promethazine Itching     Other reaction(s): Not Indicated, Unknown  hallucinations         Family History   Problem Relation Age of Onset    No Known Problems Mother     No Known Problems Father        Social History     Socioeconomic History    Marital status:    Tobacco Use    Smoking status: Never     Passive exposure: Never    Smokeless tobacco: Never   Substance and Sexual Activity    Alcohol use: Yes     Comment: soc    Drug use: Never       Review of Systems:    Constitution:   Denies chills, fever, and sweats.  HENT:   Denies headaches or blurry vision.  Cardiovascular:  Denies chest pain or irregular heart beat.  Respiratory:   Denies cough or shortness of breath.  Gastrointestinal:  Denies abdominal pain, nausea, or vomiting.  Musculoskeletal:   Denies muscle cramps.  Neurological:   Denies dizziness or focal weakness.  Psychiatric/Behavior: Normal mental status.  Hematology/Lymph:  Denies bleeding problem or easy bruising/bleeding.  Skin:    Denies rash or suspicious lesions.    Examination:    Vital Signs:    Vitals:    07/26/23 1326   BP: 116/73   Pulse: 77   Weight:  "61.2 kg (135 lb)   Height: 5' 3" (1.6 m)       Body mass index is 23.91 kg/m².    Constitution:   Well-developed, well nourished patient in no acute distress.  Neurological:   Alert and oriented x 3 and cooperative to examination.     Psychiatric/Behavior: Normal mental status.  Respiratory:   No shortness of breath.  Eyes:    Extraoccular muscles intact  Skin:    No scars, rash or suspicious lesions.    MSK:     Left wrist:  No open wounds or rashes.  Full range of motion of the wrist hand and digits.  Tenderness to palpation of the 1st dorsal compartment.  Finkelstein's positive.  There is a 2-3 mm round well-circumscribed mobile cyst just under the skin over the 1st dorsal compartment.  She is able to make a fist and extend his digits.  She is neurovascularly intact.  Radial pulse 2 +hand is warm well perfused.  There is no tenderness to palpation over the thumb CMC joint.  No tenderness to palpation the intersection 1st dorsal compartments.  No tenderness to palpation over the thumb A1 pulley.    Imaging:     X-ray of the left wrist shows no fractures or dislocations     Assessment:  left de Quervain tenosynovitis, retinacular cyst    Plan:   She requests another injection today.  I will give her another injection to the left 1st dorsal compartment.  If this injection does not work for her I think she could be a good candidate for surgery.  I can see her back in 2-3 months to see how she did from this injection.  She can continue doing therapy and bracing in the meanwhile    Follow Up:   2-3 months  Xray at next visit:    none          "

## 2023-09-07 ENCOUNTER — PATIENT MESSAGE (OUTPATIENT)
Dept: RESEARCH | Facility: HOSPITAL | Age: 63
End: 2023-09-07
Payer: COMMERCIAL

## 2023-10-18 ENCOUNTER — OFFICE VISIT (OUTPATIENT)
Dept: ORTHOPEDICS | Facility: CLINIC | Age: 63
End: 2023-10-18
Payer: COMMERCIAL

## 2023-10-18 ENCOUNTER — HOSPITAL ENCOUNTER (OUTPATIENT)
Dept: RADIOLOGY | Facility: HOSPITAL | Age: 63
Discharge: HOME | End: 2023-10-18
Attending: STUDENT IN AN ORGANIZED HEALTH CARE EDUCATION/TRAINING PROGRAM
Payer: COMMERCIAL

## 2023-10-18 VITALS
BODY MASS INDEX: 23.57 KG/M2 | WEIGHT: 133 LBS | SYSTOLIC BLOOD PRESSURE: 156 MMHG | HEART RATE: 82 BPM | HEIGHT: 63 IN | DIASTOLIC BLOOD PRESSURE: 114 MMHG

## 2023-10-18 DIAGNOSIS — Z01.818 PREOP EXAMINATION: ICD-10-CM

## 2023-10-18 DIAGNOSIS — M65.4 DE QUERVAIN'S TENOSYNOVITIS, LEFT: Primary | ICD-10-CM

## 2023-10-18 DIAGNOSIS — M65.4 RADIAL STYLOID TENOSYNOVITIS OF LEFT HAND: ICD-10-CM

## 2023-10-18 DIAGNOSIS — M65.4 DE QUERVAIN'S TENOSYNOVITIS, LEFT: ICD-10-CM

## 2023-10-18 PROCEDURE — 3080F PR MOST RECENT DIASTOLIC BLOOD PRESSURE >= 90 MM HG: ICD-10-PCS | Mod: CPTII,,, | Performed by: STUDENT IN AN ORGANIZED HEALTH CARE EDUCATION/TRAINING PROGRAM

## 2023-10-18 PROCEDURE — 3077F PR MOST RECENT SYSTOLIC BLOOD PRESSURE >= 140 MM HG: ICD-10-PCS | Mod: CPTII,,, | Performed by: STUDENT IN AN ORGANIZED HEALTH CARE EDUCATION/TRAINING PROGRAM

## 2023-10-18 PROCEDURE — 3008F PR BODY MASS INDEX (BMI) DOCUMENTED: ICD-10-PCS | Mod: CPTII,,, | Performed by: STUDENT IN AN ORGANIZED HEALTH CARE EDUCATION/TRAINING PROGRAM

## 2023-10-18 PROCEDURE — 1159F MED LIST DOCD IN RCRD: CPT | Mod: CPTII,,, | Performed by: STUDENT IN AN ORGANIZED HEALTH CARE EDUCATION/TRAINING PROGRAM

## 2023-10-18 PROCEDURE — 71046 X-RAY EXAM CHEST 2 VIEWS: CPT | Mod: TC

## 2023-10-18 PROCEDURE — 1159F PR MEDICATION LIST DOCUMENTED IN MEDICAL RECORD: ICD-10-PCS | Mod: CPTII,,, | Performed by: STUDENT IN AN ORGANIZED HEALTH CARE EDUCATION/TRAINING PROGRAM

## 2023-10-18 PROCEDURE — 3080F DIAST BP >= 90 MM HG: CPT | Mod: CPTII,,, | Performed by: STUDENT IN AN ORGANIZED HEALTH CARE EDUCATION/TRAINING PROGRAM

## 2023-10-18 PROCEDURE — 99214 PR OFFICE/OUTPT VISIT, EST, LEVL IV, 30-39 MIN: ICD-10-PCS | Mod: ,,, | Performed by: STUDENT IN AN ORGANIZED HEALTH CARE EDUCATION/TRAINING PROGRAM

## 2023-10-18 PROCEDURE — 99214 OFFICE O/P EST MOD 30 MIN: CPT | Mod: ,,, | Performed by: STUDENT IN AN ORGANIZED HEALTH CARE EDUCATION/TRAINING PROGRAM

## 2023-10-18 PROCEDURE — 3008F BODY MASS INDEX DOCD: CPT | Mod: CPTII,,, | Performed by: STUDENT IN AN ORGANIZED HEALTH CARE EDUCATION/TRAINING PROGRAM

## 2023-10-18 PROCEDURE — 3077F SYST BP >= 140 MM HG: CPT | Mod: CPTII,,, | Performed by: STUDENT IN AN ORGANIZED HEALTH CARE EDUCATION/TRAINING PROGRAM

## 2023-10-18 RX ORDER — MELOXICAM 15 MG/1
15 TABLET ORAL DAILY
COMMUNITY
Start: 2023-10-09

## 2023-10-18 RX ORDER — SODIUM CHLORIDE 9 MG/ML
INJECTION, SOLUTION INTRAVENOUS CONTINUOUS
Status: CANCELLED | OUTPATIENT
Start: 2023-10-18

## 2023-10-19 NOTE — PROGRESS NOTES
Chief Complaint: Left wrist pain with cyst    Consulting Physician: No ref. provider found    History of present illness:     Patient is a 62-year-old female who presents for follow up evaluation of left dequervains tenosynovitis.  She continues to have pain.  She has tried a considerable amount of nonoperative therapy including occupational therapy and injections.  The injection gave her relief for about a week and she would like surgery.  Past Medical History:   Diagnosis Date    Chronic bronchitis        Past Surgical History:   Procedure Laterality Date    HYSTERECTOMY      TRACHEOTOMY         Current Outpatient Medications   Medication Sig    albuterol (PROVENTIL/VENTOLIN HFA) 90 mcg/actuation inhaler Inhale into the lungs.    meloxicam (MOBIC) 15 MG tablet Take 15 mg by mouth.     No current facility-administered medications for this visit.       Review of patient's allergies indicates:   Allergen Reactions    Sulfa (sulfonamide antibiotics) Shortness Of Breath    Promethazine Itching     Other reaction(s): Not Indicated, Unknown  hallucinations         Family History   Problem Relation Age of Onset    No Known Problems Mother     No Known Problems Father        Social History     Socioeconomic History    Marital status:    Tobacco Use    Smoking status: Never     Passive exposure: Never    Smokeless tobacco: Never   Substance and Sexual Activity    Alcohol use: Yes     Comment: soc    Drug use: Never       Review of Systems:    Constitution:   Denies chills, fever, and sweats.  HENT:   Denies headaches or blurry vision.  Cardiovascular:  Denies chest pain or irregular heart beat.  Respiratory:   Denies cough or shortness of breath.  Gastrointestinal:  Denies abdominal pain, nausea, or vomiting.  Musculoskeletal:   Denies muscle cramps.  Neurological:   Denies dizziness or focal weakness.  Psychiatric/Behavior: Normal mental status.  Hematology/Lymph:  Denies bleeding problem or easy  Goal Outcome Evaluation: Monitoring hgb, last one 7.6., Will give one unit of blood when available. Takes oxycodone for pain left hip. Left hip with edema and taut to touch. Dressing CDI with no drainage. CTA abd/pelvis done . Given 10 mg IV lasix and will give another 10 mg after transfusion. ST continues with soft BP 80-100s. Right groin site CDI. IMC pt.                       "bruising/bleeding.  Skin:    Denies rash or suspicious lesions.    Examination:    Vital Signs:    Vitals:    10/18/23 0941   BP: (!) 156/114   Pulse: 82   Weight: 60.3 kg (133 lb)   Height: 5' 3" (1.6 m)       Body mass index is 23.56 kg/m².    Constitution:   Well-developed, well nourished patient in no acute distress.  Neurological:   Alert and oriented x 3 and cooperative to examination.     Psychiatric/Behavior: Normal mental status.  Respiratory:   No shortness of breath.  Eyes:    Extraoccular muscles intact  Skin:    No scars, rash or suspicious lesions.    MSK:     Left wrist:  No open wounds or rashes.  Full range of motion of the wrist hand and digits.  Tenderness to palpation of the 1st dorsal compartment.  Finkelstein's positive.  There is a 2-3 mm round well-circumscribed mobile cyst just under the skin over the 1st dorsal compartment.  She is able to make a fist and extend his digits.  She is neurovascularly intact.  Radial pulse 2 +hand is warm well perfused.  There is no tenderness to palpation over the thumb CMC joint.  No tenderness to palpation the intersection 1st dorsal compartments.  No tenderness to palpation over the thumb A1 pulley.    Imaging:     X-ray of the left wrist shows no fractures or dislocations     Assessment:  left de Quervain tenosynovitis, retinacular cyst    Plan:   She is failed conservative treatment.  We have tried an injection as well as formal occupational therapy.  She continues to have pain.  I will book her for a left de Quervain release as well as retinacular cyst excision.  She would like to schedule this on 11/28/2023    I explained that surgery and the nature of their condition are not without risks. These include, but are not limited to, bleeding, infection, neurovascular compromise, wound complications, scarring, cosmetic defects, need for later and/or repeated surgeries, pain, loss of ROM, loss of function, deformity, functional abnormalities, stiffness, " thromboembolic complications, compartment syndrome, loss of limb, loss of life, anesthetic complications, and other imponderables. I explained that these can occur despite the adequacy of treatments rendered, and that their risks are heightened given the nature of their condition. They verbalized understanding. They would like to continue with surgery at this time. If appropriate family was involved with surgical discussion.      Follow Up:   After surgery  Xray at next visit:    none

## 2023-10-19 NOTE — H&P (VIEW-ONLY)
Chief Complaint: Left wrist pain with cyst    Consulting Physician: No ref. provider found    History of present illness:     Patient is a 62-year-old female who presents for follow up evaluation of left dequervains tenosynovitis.  She continues to have pain.  She has tried a considerable amount of nonoperative therapy including occupational therapy and injections.  The injection gave her relief for about a week and she would like surgery.  Past Medical History:   Diagnosis Date    Chronic bronchitis        Past Surgical History:   Procedure Laterality Date    HYSTERECTOMY      TRACHEOTOMY         Current Outpatient Medications   Medication Sig    albuterol (PROVENTIL/VENTOLIN HFA) 90 mcg/actuation inhaler Inhale into the lungs.    meloxicam (MOBIC) 15 MG tablet Take 15 mg by mouth.     No current facility-administered medications for this visit.       Review of patient's allergies indicates:   Allergen Reactions    Sulfa (sulfonamide antibiotics) Shortness Of Breath    Promethazine Itching     Other reaction(s): Not Indicated, Unknown  hallucinations         Family History   Problem Relation Age of Onset    No Known Problems Mother     No Known Problems Father        Social History     Socioeconomic History    Marital status:    Tobacco Use    Smoking status: Never     Passive exposure: Never    Smokeless tobacco: Never   Substance and Sexual Activity    Alcohol use: Yes     Comment: soc    Drug use: Never       Review of Systems:    Constitution:   Denies chills, fever, and sweats.  HENT:   Denies headaches or blurry vision.  Cardiovascular:  Denies chest pain or irregular heart beat.  Respiratory:   Denies cough or shortness of breath.  Gastrointestinal:  Denies abdominal pain, nausea, or vomiting.  Musculoskeletal:   Denies muscle cramps.  Neurological:   Denies dizziness or focal weakness.  Psychiatric/Behavior: Normal mental status.  Hematology/Lymph:  Denies bleeding problem or easy  "bruising/bleeding.  Skin:    Denies rash or suspicious lesions.    Examination:    Vital Signs:    Vitals:    10/18/23 0941   BP: (!) 156/114   Pulse: 82   Weight: 60.3 kg (133 lb)   Height: 5' 3" (1.6 m)       Body mass index is 23.56 kg/m².    Constitution:   Well-developed, well nourished patient in no acute distress.  Neurological:   Alert and oriented x 3 and cooperative to examination.     Psychiatric/Behavior: Normal mental status.  Respiratory:   No shortness of breath.  Eyes:    Extraoccular muscles intact  Skin:    No scars, rash or suspicious lesions.    MSK:     Left wrist:  No open wounds or rashes.  Full range of motion of the wrist hand and digits.  Tenderness to palpation of the 1st dorsal compartment.  Finkelstein's positive.  There is a 2-3 mm round well-circumscribed mobile cyst just under the skin over the 1st dorsal compartment.  She is able to make a fist and extend his digits.  She is neurovascularly intact.  Radial pulse 2 +hand is warm well perfused.  There is no tenderness to palpation over the thumb CMC joint.  No tenderness to palpation the intersection 1st dorsal compartments.  No tenderness to palpation over the thumb A1 pulley.    Imaging:     X-ray of the left wrist shows no fractures or dislocations     Assessment:  left de Quervain tenosynovitis, retinacular cyst    Plan:   She is failed conservative treatment.  We have tried an injection as well as formal occupational therapy.  She continues to have pain.  I will book her for a left de Quervain release as well as retinacular cyst excision.  She would like to schedule this on 11/28/2023    I explained that surgery and the nature of their condition are not without risks. These include, but are not limited to, bleeding, infection, neurovascular compromise, wound complications, scarring, cosmetic defects, need for later and/or repeated surgeries, pain, loss of ROM, loss of function, deformity, functional abnormalities, stiffness, " thromboembolic complications, compartment syndrome, loss of limb, loss of life, anesthetic complications, and other imponderables. I explained that these can occur despite the adequacy of treatments rendered, and that their risks are heightened given the nature of their condition. They verbalized understanding. They would like to continue with surgery at this time. If appropriate family was involved with surgical discussion.      Follow Up:   After surgery  Xray at next visit:    none

## 2023-10-23 ENCOUNTER — TELEPHONE (OUTPATIENT)
Dept: ORTHOPEDICS | Facility: CLINIC | Age: 63
End: 2023-10-23
Payer: COMMERCIAL

## 2023-10-23 NOTE — TELEPHONE ENCOUNTER
Patient called stating that she has the stomach virus and fever. Discussed rescheduled with the patient. Patient agreed to 11/3/23. I called surgery scheduling and requested that they move her surgery.

## 2023-11-01 ENCOUNTER — ANESTHESIA EVENT (OUTPATIENT)
Dept: SURGERY | Facility: HOSPITAL | Age: 63
End: 2023-11-01
Payer: COMMERCIAL

## 2023-11-01 RX ORDER — ACETAMINOPHEN 500 MG
1000 TABLET ORAL EVERY 6 HOURS PRN
COMMUNITY
End: 2024-02-26

## 2023-11-03 ENCOUNTER — ANESTHESIA (OUTPATIENT)
Dept: SURGERY | Facility: HOSPITAL | Age: 63
End: 2023-11-03
Payer: COMMERCIAL

## 2023-11-03 ENCOUNTER — HOSPITAL ENCOUNTER (OUTPATIENT)
Facility: HOSPITAL | Age: 63
Discharge: HOME | End: 2023-11-03
Attending: STUDENT IN AN ORGANIZED HEALTH CARE EDUCATION/TRAINING PROGRAM | Admitting: STUDENT IN AN ORGANIZED HEALTH CARE EDUCATION/TRAINING PROGRAM
Payer: COMMERCIAL

## 2023-11-03 VITALS
HEIGHT: 63 IN | TEMPERATURE: 96 F | BODY MASS INDEX: 23.48 KG/M2 | OXYGEN SATURATION: 98 % | SYSTOLIC BLOOD PRESSURE: 158 MMHG | DIASTOLIC BLOOD PRESSURE: 99 MMHG | HEART RATE: 60 BPM | WEIGHT: 132.5 LBS | RESPIRATION RATE: 18 BRPM

## 2023-11-03 DIAGNOSIS — M65.4 RADIAL STYLOID TENOSYNOVITIS OF LEFT HAND: ICD-10-CM

## 2023-11-03 DIAGNOSIS — Z01.818 PREOP EXAMINATION: ICD-10-CM

## 2023-11-03 DIAGNOSIS — M65.4 DE QUERVAIN'S TENOSYNOVITIS, LEFT: ICD-10-CM

## 2023-11-03 PROCEDURE — D9220A PRA ANESTHESIA: ICD-10-PCS | Mod: CRNA,,, | Performed by: NURSE ANESTHETIST, CERTIFIED REGISTERED

## 2023-11-03 PROCEDURE — 26113 PR EX TUM/VASC MAL SFT TIS HAND/FNGR SUBFSC 1.5+CM: ICD-10-PCS | Mod: LT,,, | Performed by: STUDENT IN AN ORGANIZED HEALTH CARE EDUCATION/TRAINING PROGRAM

## 2023-11-03 PROCEDURE — D9220A PRA ANESTHESIA: Mod: ANES,,, | Performed by: STUDENT IN AN ORGANIZED HEALTH CARE EDUCATION/TRAINING PROGRAM

## 2023-11-03 PROCEDURE — 37000009 HC ANESTHESIA EA ADD 15 MINS: Performed by: STUDENT IN AN ORGANIZED HEALTH CARE EDUCATION/TRAINING PROGRAM

## 2023-11-03 PROCEDURE — 25000 INCISION OF TENDON SHEATH: CPT | Mod: 51,LT,, | Performed by: STUDENT IN AN ORGANIZED HEALTH CARE EDUCATION/TRAINING PROGRAM

## 2023-11-03 PROCEDURE — 25000003 PHARM REV CODE 250: Performed by: NURSE ANESTHETIST, CERTIFIED REGISTERED

## 2023-11-03 PROCEDURE — 36000707: Performed by: STUDENT IN AN ORGANIZED HEALTH CARE EDUCATION/TRAINING PROGRAM

## 2023-11-03 PROCEDURE — 71000016 HC POSTOP RECOV ADDL HR: Performed by: STUDENT IN AN ORGANIZED HEALTH CARE EDUCATION/TRAINING PROGRAM

## 2023-11-03 PROCEDURE — 36000706: Performed by: STUDENT IN AN ORGANIZED HEALTH CARE EDUCATION/TRAINING PROGRAM

## 2023-11-03 PROCEDURE — 88305 TISSUE EXAM BY PATHOLOGIST: CPT | Performed by: STUDENT IN AN ORGANIZED HEALTH CARE EDUCATION/TRAINING PROGRAM

## 2023-11-03 PROCEDURE — D9220A PRA ANESTHESIA: Mod: CRNA,,, | Performed by: NURSE ANESTHETIST, CERTIFIED REGISTERED

## 2023-11-03 PROCEDURE — 71000015 HC POSTOP RECOV 1ST HR: Performed by: STUDENT IN AN ORGANIZED HEALTH CARE EDUCATION/TRAINING PROGRAM

## 2023-11-03 PROCEDURE — D9220A PRA ANESTHESIA: ICD-10-PCS | Mod: ANES,,, | Performed by: STUDENT IN AN ORGANIZED HEALTH CARE EDUCATION/TRAINING PROGRAM

## 2023-11-03 PROCEDURE — 25000003 PHARM REV CODE 250: Performed by: STUDENT IN AN ORGANIZED HEALTH CARE EDUCATION/TRAINING PROGRAM

## 2023-11-03 PROCEDURE — 63600175 PHARM REV CODE 636 W HCPCS: Performed by: STUDENT IN AN ORGANIZED HEALTH CARE EDUCATION/TRAINING PROGRAM

## 2023-11-03 PROCEDURE — 26113 EXC HAND TUM DEEP 1.5 CM/>: CPT | Mod: LT,,, | Performed by: STUDENT IN AN ORGANIZED HEALTH CARE EDUCATION/TRAINING PROGRAM

## 2023-11-03 PROCEDURE — 37000008 HC ANESTHESIA 1ST 15 MINUTES: Performed by: STUDENT IN AN ORGANIZED HEALTH CARE EDUCATION/TRAINING PROGRAM

## 2023-11-03 PROCEDURE — 63600175 PHARM REV CODE 636 W HCPCS: Performed by: NURSE ANESTHETIST, CERTIFIED REGISTERED

## 2023-11-03 PROCEDURE — 25000 PR INCIS TENDON SHEATH,RADIAL STYLOID: ICD-10-PCS | Mod: 51,LT,, | Performed by: STUDENT IN AN ORGANIZED HEALTH CARE EDUCATION/TRAINING PROGRAM

## 2023-11-03 RX ORDER — METHOCARBAMOL 500 MG/1
500 TABLET, FILM COATED ORAL EVERY 6 HOURS PRN
Status: DISCONTINUED | OUTPATIENT
Start: 2023-11-03 | End: 2023-11-03 | Stop reason: HOSPADM

## 2023-11-03 RX ORDER — HYDROCODONE BITARTRATE AND ACETAMINOPHEN 5; 325 MG/1; MG/1
1 TABLET ORAL EVERY 4 HOURS PRN
Status: DISCONTINUED | OUTPATIENT
Start: 2023-11-03 | End: 2023-11-03 | Stop reason: HOSPADM

## 2023-11-03 RX ORDER — HYDROCODONE BITARTRATE AND ACETAMINOPHEN 5; 325 MG/1; MG/1
1 TABLET ORAL EVERY 6 HOURS PRN
Qty: 28 TABLET | Refills: 0 | Status: SHIPPED | OUTPATIENT
Start: 2023-11-03 | End: 2024-02-26

## 2023-11-03 RX ORDER — MORPHINE SULFATE 4 MG/ML
4 INJECTION, SOLUTION INTRAMUSCULAR; INTRAVENOUS
Status: DISCONTINUED | OUTPATIENT
Start: 2023-11-03 | End: 2023-11-03 | Stop reason: HOSPADM

## 2023-11-03 RX ORDER — SODIUM CHLORIDE 9 MG/ML
INJECTION, SOLUTION INTRAVENOUS CONTINUOUS
Status: DISCONTINUED | OUTPATIENT
Start: 2023-11-03 | End: 2023-11-03 | Stop reason: HOSPADM

## 2023-11-03 RX ORDER — ONDANSETRON 2 MG/ML
4 INJECTION INTRAMUSCULAR; INTRAVENOUS EVERY 6 HOURS PRN
Status: DISCONTINUED | OUTPATIENT
Start: 2023-11-03 | End: 2023-11-03 | Stop reason: HOSPADM

## 2023-11-03 RX ORDER — LIDOCAINE HYDROCHLORIDE 10 MG/ML
INJECTION, SOLUTION EPIDURAL; INFILTRATION; INTRACAUDAL; PERINEURAL
Status: DISCONTINUED | OUTPATIENT
Start: 2023-11-03 | End: 2023-11-03

## 2023-11-03 RX ORDER — METOCLOPRAMIDE HYDROCHLORIDE 5 MG/ML
10 INJECTION INTRAMUSCULAR; INTRAVENOUS EVERY 6 HOURS PRN
Status: DISCONTINUED | OUTPATIENT
Start: 2023-11-03 | End: 2023-11-03 | Stop reason: HOSPADM

## 2023-11-03 RX ORDER — PROPOFOL 10 MG/ML
VIAL (ML) INTRAVENOUS
Status: DISCONTINUED | OUTPATIENT
Start: 2023-11-03 | End: 2023-11-03

## 2023-11-03 RX ADMIN — PROPOFOL 20 MG: 10 INJECTION, EMULSION INTRAVENOUS at 09:11

## 2023-11-03 RX ADMIN — LIDOCAINE HYDROCHLORIDE 50 MG: 10 INJECTION, SOLUTION EPIDURAL; INFILTRATION; INTRACAUDAL; PERINEURAL at 09:11

## 2023-11-03 RX ADMIN — PROPOFOL 10 MG: 10 INJECTION, EMULSION INTRAVENOUS at 09:11

## 2023-11-03 RX ADMIN — HYDROCODONE BITARTRATE AND ACETAMINOPHEN 1 TABLET: 5; 325 TABLET ORAL at 10:11

## 2023-11-03 RX ADMIN — SODIUM CHLORIDE, SODIUM GLUCONATE, SODIUM ACETATE, POTASSIUM CHLORIDE AND MAGNESIUM CHLORIDE: 526; 502; 368; 37; 30 INJECTION, SOLUTION INTRAVENOUS at 09:11

## 2023-11-03 RX ADMIN — PROPOFOL 80 MG: 10 INJECTION, EMULSION INTRAVENOUS at 09:11

## 2023-11-03 RX ADMIN — CEFAZOLIN 2 G: 2 INJECTION, POWDER, FOR SOLUTION INTRAMUSCULAR; INTRAVENOUS at 09:11

## 2023-11-03 NOTE — ANESTHESIA PREPROCEDURE EVALUATION
"                                                                                                             11/03/2023  Zari Wilhelm is a 62 y.o., female.    Other Medical History   Chronic bronchitis Pain   Arthritis Hoarseness of voice     Surgica History  TRACHEOTOMY HYSTERECTOMY   CLOSURE OF FISTULA OF TRACHEA LARYNGOSCOPY   vocal cord surgery TONSILLECTOMY     Hx of trach when younger.  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  Pt has been told that she needs a "number 4 tube" if she requires intubation.  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  No airway symptoms when lying flat.  Plan today for MAC / local.      Pre-op Assessment    I have reviewed the Patient Summary Reports.     I have reviewed the Nursing Notes. I have reviewed the NPO Status.   I have reviewed the Medications.     Review of Systems  Anesthesia Hx:   Denies Personal Hx of Anesthesia complications.   Cardiovascular:   Exercise tolerance: good    Pulmonary:   Asthma Hx of trach   Hepatic/GI:  Hepatic/GI Normal    Musculoskeletal:  Musculoskeletal Normal    Endocrine:  Endocrine Normal        Physical Exam  General: Well nourished, Cooperative and Alert    Airway:  Mallampati: II   Mouth Opening: Normal  TM Distance: Normal      Chest/Lungs:  Normal Respiratory Rate        Anesthesia Plan  Type of Anesthesia, risks & benefits discussed:    Anesthesia Type: MAC  Intra-op Monitoring Plan: Standard ASA Monitors  Post Op Pain Control Plan: IV/PO Opioids PRN  Induction:  IV  Informed Consent: Informed consent signed with the Patient and all parties understand the risks and agree with anesthesia plan.  All questions answered.   ASA Score: 3  Day of Surgery Review of History & Physical: H&P Update referred to the surgeon/provider.    Ready For Surgery From Anesthesia Perspective.     .      "

## 2023-11-03 NOTE — DISCHARGE SUMMARY
[facility] - Periop Services  Discharge Note  Short Stay    Procedure(s) (LRB):  RELEASE, HAND, FOR DEQUERVAIN'S TENOSYNOVITIS (Left)      OUTCOME: Patient tolerated treatment/procedure well without complication and is now ready for discharge.    DISPOSITION: Home or Self Care    FINAL DIAGNOSIS:  <principal problem not specified>    FOLLOWUP: In clinic    DISCHARGE INSTRUCTIONS:    Discharge Procedure Orders   Diet general     Activity as tolerated     Keep surgical extremity elevated     Ice to affected area     Lifting restrictions   Order Comments: No lifting, pushing, pulling with operative extremity     No driving, operating heavy equipment or signing legal documents while taking pain medication.     Other restrictions (specify):   Order Comments: Okay to wean sling as tolerated.     Leave dressing on - Keep it clean, dry, and intact until clinic visit     Call MD for:  temperature >100.4     Call MD for:  persistent nausea and vomiting     Call MD for:  severe uncontrolled pain     Call MD for:  difficulty breathing, headache or visual disturbances     Call MD for:  redness, tenderness, or signs of infection (pain, swelling, redness, odor or green/yellow discharge around incision site)     Call MD for:  hives     Call MD for:  persistent dizziness or light-headedness     Call MD for:  extreme fatigue     Shower on day dressing removed (No bath)        TIME SPENT ON DISCHARGE: 15 minutes

## 2023-11-03 NOTE — TRANSFER OF CARE
"Anesthesia Transfer of Care Note    Patient: Zari Wilhelm    Procedure(s) Performed: Procedure(s) (LRB):  RELEASE, HAND, FOR DEQUERVAIN'S TENOSYNOVITIS (Left)    Patient location: OPS    Anesthesia Type: general    Transport from OR: Transported from OR on room air with adequate spontaneous ventilation    Post pain: adequate analgesia    Post assessment: no apparent anesthetic complications and tolerated procedure well    Post vital signs: stable    Level of consciousness: awake, alert and oriented    Nausea/Vomiting: no nausea/vomiting    Complications: none    Transfer of care protocol was followed      Last vitals:   Visit Vitals  BP (!) 148/100   Pulse 80   Temp 36 °C (96.8 °F) (Tympanic)   Resp 20   Ht 5' 3" (1.6 m)   Wt 60.1 kg (132 lb 7.9 oz)   SpO2 98%   Breastfeeding No   BMI 23.47 kg/m²     "

## 2023-11-06 NOTE — OP NOTE
Operative Note    Patient Information:  Zari Wilhelm    Date of Surgery:  11/03/2023    Surgeon:  Julian Gautam MD    Assistant:  None    Pre-operative Diagnosis:  Left DeQuervain's tenosynovitis     Post-operative Diagnosis:  same    Procedure Performed:  Left first dorsal compartment extensor tendon sheath incision 28616  Left wrist tumor vascular malformation excisional biopsy subfascial 1.7 cm x 1 cm  CPT 21274    Anesthesia:  MAC    Complications:  None    Blood Loss:  See anesthesia record    Specimens:  Left first dorsal compartment mass    Implants:  * No implants in log *     Indications for Procedure:  Zari Wilhelm is a 62 y.o. female that has de Quervain tenosynovitis.    Risks and benefits of the procedure were discussed with the patient. I explained that surgery and the nature of their condition are not without risks. These include, but are not limited to, bleeding, infection, neurovascular compromise, wound complications, scarring, cosmetic defects, need for later and/or repeated surgeries, pain, loss of ROM, loss of function, deformity, functional abnormalities, stiffness, thromboembolic complications, compartment syndrome, loss of limb, loss of life, anesthetic complications, and other imponderables. I explained that these can occur despite the adequacy of treatments rendered, and that their risks are heightened given the nature of their condition. They verbalized understanding. They would like to continue with surgery at this time. If appropriate family was involved with surgical discussion. The patient expressed understanding of and agreement with the plan. Informed consent was obtained and signed prior going to the operative room.    Procedure in Detail:  Patient was brought to the operating room and placed under MAC anesthesia by the anesthesia department without difficulty. The patient was then placed in the supine position on the operating room table.     Time out was performed and  all parties present agreed with correct patient, correct procedure, correct side, correct site.  A local block was performed with local anesthesia.  The operative extremity was prepped and draped in standard normal fashion.     Pre-incision antibiotics were administered prior to skin incision. The tourniquet was inflated to 250mm HG.     An oblique incision was made over the radial styloid.  Care was taken to dissect through subcutaneous tissue while protecting superficial branch radial nerve.  Dissection was carried down to the 1st dorsal compartment extensor tendon sheath.  The sheath was sharply incised dorsally to allow a volar cuffed to prevent volar subluxation of the tendons.  The sheath of the APL tendon was released to the membranous portion of the tendon sheath.  There was a separate EPB subsheath which was released in its entirety.  The wrist was ranged and the tendons did not volarly subluxed.  There was a mass within the 1st dorsal compartment.  This measured 1.7 cm x 1 cm.  This was excised in its entirety and sent for pathology.  The wound was irrigated.  Skin was closed with 3-0 nylon suture.      Tourniquet was deflated. Patient was extubated without complications and transferred to the recovery room in stable condition.       Post-operative Plan:  Patient will come to clinic in 2 weeks for wound check.

## 2023-11-10 LAB — VIEW PATHOLOGY REPORT (RELIAPATH): NORMAL

## 2023-11-22 ENCOUNTER — OFFICE VISIT (OUTPATIENT)
Dept: ORTHOPEDICS | Facility: CLINIC | Age: 63
End: 2023-11-22
Payer: COMMERCIAL

## 2023-11-22 VITALS
HEART RATE: 70 BPM | SYSTOLIC BLOOD PRESSURE: 164 MMHG | BODY MASS INDEX: 23.39 KG/M2 | HEIGHT: 63 IN | DIASTOLIC BLOOD PRESSURE: 93 MMHG | WEIGHT: 132 LBS

## 2023-11-22 DIAGNOSIS — M65.4 DE QUERVAIN'S TENOSYNOVITIS, LEFT: Primary | ICD-10-CM

## 2023-11-22 PROCEDURE — 1159F PR MEDICATION LIST DOCUMENTED IN MEDICAL RECORD: ICD-10-PCS | Mod: CPTII,,, | Performed by: STUDENT IN AN ORGANIZED HEALTH CARE EDUCATION/TRAINING PROGRAM

## 2023-11-22 PROCEDURE — 4010F PR ACE/ARB THEARPY RXD/TAKEN: ICD-10-PCS | Mod: CPTII,,, | Performed by: STUDENT IN AN ORGANIZED HEALTH CARE EDUCATION/TRAINING PROGRAM

## 2023-11-22 PROCEDURE — 1159F MED LIST DOCD IN RCRD: CPT | Mod: CPTII,,, | Performed by: STUDENT IN AN ORGANIZED HEALTH CARE EDUCATION/TRAINING PROGRAM

## 2023-11-22 PROCEDURE — 99024 POSTOP FOLLOW-UP VISIT: CPT | Mod: ,,, | Performed by: STUDENT IN AN ORGANIZED HEALTH CARE EDUCATION/TRAINING PROGRAM

## 2023-11-22 PROCEDURE — 99024 PR POST-OP FOLLOW-UP VISIT: ICD-10-PCS | Mod: ,,, | Performed by: STUDENT IN AN ORGANIZED HEALTH CARE EDUCATION/TRAINING PROGRAM

## 2023-11-22 PROCEDURE — 4010F ACE/ARB THERAPY RXD/TAKEN: CPT | Mod: CPTII,,, | Performed by: STUDENT IN AN ORGANIZED HEALTH CARE EDUCATION/TRAINING PROGRAM

## 2023-11-22 PROCEDURE — 3077F PR MOST RECENT SYSTOLIC BLOOD PRESSURE >= 140 MM HG: ICD-10-PCS | Mod: CPTII,,, | Performed by: STUDENT IN AN ORGANIZED HEALTH CARE EDUCATION/TRAINING PROGRAM

## 2023-11-22 PROCEDURE — 3080F DIAST BP >= 90 MM HG: CPT | Mod: CPTII,,, | Performed by: STUDENT IN AN ORGANIZED HEALTH CARE EDUCATION/TRAINING PROGRAM

## 2023-11-22 PROCEDURE — 3080F PR MOST RECENT DIASTOLIC BLOOD PRESSURE >= 90 MM HG: ICD-10-PCS | Mod: CPTII,,, | Performed by: STUDENT IN AN ORGANIZED HEALTH CARE EDUCATION/TRAINING PROGRAM

## 2023-11-22 PROCEDURE — 3077F SYST BP >= 140 MM HG: CPT | Mod: CPTII,,, | Performed by: STUDENT IN AN ORGANIZED HEALTH CARE EDUCATION/TRAINING PROGRAM

## 2023-11-22 NOTE — PROGRESS NOTES
"Postop Clinic Note    Surgery:  left 1st dorsal compartment release on 11/3/23    History of present illness:    Patient is doing well. No fevers or chills. Pain over the radial wrist has improved. No numbness or tingling.       Past Surgical History:   Procedure Laterality Date    CLOSURE OF FISTULA OF TRACHEA      DE QUERVAIN'S RELEASE Left 11/3/2023    Procedure: RELEASE, HAND, FOR DEQUERVAIN'S TENOSYNOVITIS;  Surgeon: Julian Gautam MD;  Location: [facility];  Service: Orthopedics;  Laterality: Left;    HYSTERECTOMY      LARYNGOSCOPY      TONSILLECTOMY      TRACHEOTOMY      vocal cord surgery             Examination:    Vital Signs:    Vitals:    11/22/23 1013   BP: (!) 164/93   Pulse: 70   Weight: 59.9 kg (132 lb)   Height: 5' 3" (1.6 m)       Body mass index is 23.38 kg/m².    Constitution:   Well-developed, well nourished patient in no acute distress.  Neurological:   Alert and oriented x 3 and cooperative to examination.     Psychiatric/Behavior: Normal mental status.  Respiratory:   No shortness of breath.  Eyes:    Extraoccular muscles intact  Skin:    No scars, rash or suspicious lesions.    MSK:   Left Upper Extremity:  Surgical incision is healing well with absorbable sutures in place no evidence of infection.  Finkelstein test is negative.  They can make a full fist and extend digits fully.  Wrist motion is supple but appropriately stiff from immobilization. Radial pulses 2+ hand is warm well perfused    Imaging:   No new imaging     Assessment: s/p above surgery    Plan:  Patient is doing well.   Patient was counseled on scar massage.  Start easing into activity as tolerated.  They can continue wearing the brace as needed for pain however they may discontinue this once they longer have pain.  They may follow up with me if they have any issues    Follow Up: As needed  Xray at next visit:  None     "

## 2023-12-05 NOTE — ANESTHESIA POSTPROCEDURE EVALUATION
Anesthesia Post Evaluation    Patient: Zari Wilhelm    Procedure(s) Performed: Procedure(s) (LRB):  RELEASE, HAND, FOR DEQUERVAIN'S TENOSYNOVITIS (Left)    Final Anesthesia Type: MAC      Patient location during evaluation: PACU  Patient participation: Yes- Able to Participate  Level of consciousness: awake and alert  Post-procedure vital signs: reviewed and stable  Pain management: adequate  Airway patency: patent    PONV status at discharge: No PONV  Anesthetic complications: no      Respiratory status: unassisted  Hydration status: euvolemic  Follow-up not needed.              Vitals Value Taken Time   /99 11/03/23 1047   Temp 35.6 °C (96.1 °F) 11/03/23 1047   Pulse 60 11/03/23 1047   Resp 18 11/03/23 1047   SpO2 98 % 11/03/23 1047         No case tracking events are documented in the log.      Pain/Sujata Score: No data recorded

## 2023-12-19 ENCOUNTER — TELEPHONE (OUTPATIENT)
Dept: ORTHOPEDICS | Facility: CLINIC | Age: 63
End: 2023-12-19
Payer: COMMERCIAL

## 2023-12-19 DIAGNOSIS — M65.4 DE QUERVAIN'S TENOSYNOVITIS, LEFT: Primary | ICD-10-CM

## 2023-12-19 NOTE — TELEPHONE ENCOUNTER
Fátima is calling requesting a referral. In the note I do not see that Dr. Gautam wanted to order therapy.

## 2024-02-05 ENCOUNTER — TELEPHONE (OUTPATIENT)
Dept: ORTHOPEDICS | Facility: CLINIC | Age: 64
End: 2024-02-05
Payer: COMMERCIAL

## 2024-02-26 ENCOUNTER — OFFICE VISIT (OUTPATIENT)
Dept: ORTHOPEDICS | Facility: CLINIC | Age: 64
End: 2024-02-26
Payer: COMMERCIAL

## 2024-02-26 VITALS
TEMPERATURE: 98 F | WEIGHT: 133.38 LBS | HEIGHT: 63 IN | HEART RATE: 67 BPM | DIASTOLIC BLOOD PRESSURE: 88 MMHG | SYSTOLIC BLOOD PRESSURE: 158 MMHG | BODY MASS INDEX: 23.63 KG/M2

## 2024-02-26 DIAGNOSIS — M65.4 DE QUERVAIN'S TENOSYNOVITIS, LEFT: Primary | ICD-10-CM

## 2024-02-26 PROCEDURE — 3077F SYST BP >= 140 MM HG: CPT | Mod: CPTII,,, | Performed by: STUDENT IN AN ORGANIZED HEALTH CARE EDUCATION/TRAINING PROGRAM

## 2024-02-26 PROCEDURE — 3008F BODY MASS INDEX DOCD: CPT | Mod: CPTII,,, | Performed by: STUDENT IN AN ORGANIZED HEALTH CARE EDUCATION/TRAINING PROGRAM

## 2024-02-26 PROCEDURE — 99213 OFFICE O/P EST LOW 20 MIN: CPT | Mod: ,,, | Performed by: STUDENT IN AN ORGANIZED HEALTH CARE EDUCATION/TRAINING PROGRAM

## 2024-02-26 PROCEDURE — 3079F DIAST BP 80-89 MM HG: CPT | Mod: CPTII,,, | Performed by: STUDENT IN AN ORGANIZED HEALTH CARE EDUCATION/TRAINING PROGRAM

## 2024-02-26 PROCEDURE — 1159F MED LIST DOCD IN RCRD: CPT | Mod: CPTII,,, | Performed by: STUDENT IN AN ORGANIZED HEALTH CARE EDUCATION/TRAINING PROGRAM

## 2024-02-26 NOTE — PROGRESS NOTES
"Postop Clinic Note    Surgery:  left 1st dorsal compartment release on 11/3/23    History of present illness:    Patient is doing well. No fevers or chills. Pain over the radial wrist has improved. No numbness or tingling.       Past Surgical History:   Procedure Laterality Date    CLOSURE OF FISTULA OF TRACHEA      DE QUERVAIN'S RELEASE Left 11/3/2023    Procedure: RELEASE, HAND, FOR DEQUERVAIN'S TENOSYNOVITIS;  Surgeon: Julian Gautam MD;  Location: [facility];  Service: Orthopedics;  Laterality: Left;    HYSTERECTOMY      LARYNGOSCOPY      TONSILLECTOMY      TRACHEOTOMY      vocal cord surgery             Examination:    Vital Signs:    Vitals:    02/26/24 0902 02/26/24 0903   BP: (!) 151/90 (!) 158/88   Pulse: 71 67   Temp: 97.6 °F (36.4 °C)    Weight: 60.5 kg (133 lb 6.4 oz)    Height: 5' 3" (1.6 m)        Body mass index is 23.63 kg/m².    Constitution:   Well-developed, well nourished patient in no acute distress.  Neurological:   Alert and oriented x 3 and cooperative to examination.     Psychiatric/Behavior: Normal mental status.  Respiratory:   No shortness of breath.  Eyes:    Extraoccular muscles intact  Skin:    No scars, rash or suspicious lesions.    MSK:   Left Upper Extremity:  Surgical incision is healed no evidence of infection.  Finkelstein test is negative.  They can make a full fist and extend digits fully.  Wrist motion is supple but appropriately stiff from immobilization. Radial pulses 2+ hand is warm well perfused    Imaging:   No new imaging     Assessment: s/p above surgery    Plan:  Patient is doing well.   Patient was counseled on scar massage.  Continue activities as tolerated.  Follow up with me as needed    Follow Up: As needed  Xray at next visit:  None     "

## (undated) DEVICE — BANDAGE GAUZE 6PLY FLUFF 2X3

## (undated) DEVICE — BANDAGE ACE ELASTIC 6"

## (undated) DEVICE — SPONGE DERMACEA GAUZE 4X4

## (undated) DEVICE — SPONGE KERLIX SUPER 6X6.75IN

## (undated) DEVICE — ELECTRODE PATIENT RETURN DISP

## (undated) DEVICE — CUFF ATS 2 PORT SNGL BLDR 18IN

## (undated) DEVICE — APPLICATOR CHLORAPREP ORN 26ML

## (undated) DEVICE — DRESSING XEROFORM 1X8IN

## (undated) DEVICE — SOL NACL IRR 1000ML BTL

## (undated) DEVICE — DRESSING XEROFORM FOIL PK 1X8

## (undated) DEVICE — GOWN NONREINF SET-IN SLV XL

## (undated) DEVICE — BANDAGE ESMARK LATEX FREE 4INX

## (undated) DEVICE — SPLINT PLASTER EXT FAST 4X15

## (undated) DEVICE — CORD BIPOLAR 12 FOOT

## (undated) DEVICE — SUT ETHILON 3-0 FS-1 30

## (undated) DEVICE — BANDAGE KERLIX AMD

## (undated) DEVICE — DRAPE SURG W/TWL 17 5/8X23

## (undated) DEVICE — DRAPE HAND STERILE

## (undated) DEVICE — BANDAGE VELCLOSE ELAS 2INX5YD

## (undated) DEVICE — Device

## (undated) DEVICE — GLOVE PROTEXIS PI CRM 7